# Patient Record
Sex: FEMALE
[De-identification: names, ages, dates, MRNs, and addresses within clinical notes are randomized per-mention and may not be internally consistent; named-entity substitution may affect disease eponyms.]

---

## 2020-04-09 ENCOUNTER — NURSE TRIAGE (OUTPATIENT)
Dept: OTHER | Facility: CLINIC | Age: 60
End: 2020-04-09

## 2020-04-09 NOTE — TELEPHONE ENCOUNTER
Reason for Disposition   Pain also present in shoulder(s) or arm(s) or jaw    Protocols used: CHEST PAIN-ADULT-[place]    Pt is calling with c/o chest, jaw, lower back and abdominal pain that started about 10 minutes prior to calling nurse triage. Pain is constant. Pt does not know the cause of her pain. She has never experienced anything like this before. Pt does have a history of hypertension and COPD. Recommended that pt go to the ED now.

## 2023-10-11 ENCOUNTER — HOSPITAL ENCOUNTER (OUTPATIENT)
Dept: RADIOLOGY | Facility: HOSPITAL | Age: 63
Discharge: HOME | End: 2023-10-11
Payer: MEDICARE

## 2023-10-11 DIAGNOSIS — M19.90 UNSPECIFIED OSTEOARTHRITIS, UNSPECIFIED SITE: ICD-10-CM

## 2023-10-11 PROCEDURE — 73521 X-RAY EXAM HIPS BI 2 VIEWS: CPT | Mod: 50,FY

## 2023-11-02 ENCOUNTER — LAB (OUTPATIENT)
Dept: LAB | Facility: LAB | Age: 63
End: 2023-11-02
Payer: MEDICARE

## 2023-11-02 ENCOUNTER — OFFICE VISIT (OUTPATIENT)
Dept: ORTHOPEDIC SURGERY | Facility: HOSPITAL | Age: 63
End: 2023-11-02
Payer: MEDICARE

## 2023-11-02 DIAGNOSIS — M19.90 INFLAMMATORY ARTHRITIS: Primary | ICD-10-CM

## 2023-11-02 DIAGNOSIS — M19.90 INFLAMMATORY ARTHRITIS: ICD-10-CM

## 2023-11-02 DIAGNOSIS — M19.042 OSTEOARTHRITIS OF BOTH HANDS, UNSPECIFIED OSTEOARTHRITIS TYPE: ICD-10-CM

## 2023-11-02 DIAGNOSIS — G56.03 CARPAL TUNNEL SYNDROME, BILATERAL: ICD-10-CM

## 2023-11-02 DIAGNOSIS — M19.041 OSTEOARTHRITIS OF BOTH HANDS, UNSPECIFIED OSTEOARTHRITIS TYPE: ICD-10-CM

## 2023-11-02 LAB
CRP SERPL-MCNC: 1.73 MG/DL
ERYTHROCYTE [SEDIMENTATION RATE] IN BLOOD BY WESTERGREN METHOD: 21 MM/H (ref 0–30)
RHEUMATOID FACT SER NEPH-ACNC: <10 IU/ML (ref 0–15)
URATE SERPL-MCNC: 5.1 MG/DL (ref 2.3–6.7)

## 2023-11-02 PROCEDURE — 36415 COLL VENOUS BLD VENIPUNCTURE: CPT

## 2023-11-02 PROCEDURE — 86038 ANTINUCLEAR ANTIBODIES: CPT

## 2023-11-02 PROCEDURE — 85652 RBC SED RATE AUTOMATED: CPT

## 2023-11-02 PROCEDURE — 99213 OFFICE O/P EST LOW 20 MIN: CPT | Performed by: ORTHOPAEDIC SURGERY

## 2023-11-02 PROCEDURE — 86431 RHEUMATOID FACTOR QUANT: CPT

## 2023-11-02 PROCEDURE — 1036F TOBACCO NON-USER: CPT | Performed by: ORTHOPAEDIC SURGERY

## 2023-11-02 PROCEDURE — 86140 C-REACTIVE PROTEIN: CPT

## 2023-11-02 PROCEDURE — 84550 ASSAY OF BLOOD/URIC ACID: CPT

## 2023-11-02 RX ORDER — FUROSEMIDE 40 MG/1
TABLET ORAL
COMMUNITY
Start: 2020-10-22

## 2023-11-02 RX ORDER — ALBUTEROL SULFATE 0.83 MG/ML
2.5 SOLUTION RESPIRATORY (INHALATION) EVERY 4 HOURS PRN
COMMUNITY
Start: 2022-09-20

## 2023-11-02 RX ORDER — MODAFINIL 200 MG/1
TABLET ORAL
COMMUNITY
Start: 2023-08-15

## 2023-11-02 RX ORDER — LAMOTRIGINE 300 MG/1
1 TABLET, EXTENDED RELEASE ORAL DAILY
COMMUNITY

## 2023-11-02 RX ORDER — ASPIRIN 81 MG/1
81 TABLET ORAL
COMMUNITY
Start: 2022-05-13

## 2023-11-02 RX ORDER — METHYLPHENIDATE HYDROCHLORIDE 20 MG/1
TABLET ORAL
COMMUNITY

## 2023-11-02 RX ORDER — EVOLOCUMAB 140 MG/ML
140 INJECTION, SOLUTION SUBCUTANEOUS
COMMUNITY
Start: 2023-02-07

## 2023-11-02 RX ORDER — ESCITALOPRAM OXALATE 20 MG/1
30 TABLET ORAL
COMMUNITY
Start: 2015-01-27

## 2023-11-02 RX ORDER — DULOXETIN HYDROCHLORIDE 60 MG/1
60 CAPSULE, DELAYED RELEASE ORAL
COMMUNITY
Start: 2023-08-15

## 2023-11-02 RX ORDER — FLUTICASONE FUROATE, UMECLIDINIUM BROMIDE AND VILANTEROL TRIFENATATE 200; 62.5; 25 UG/1; UG/1; UG/1
1 POWDER RESPIRATORY (INHALATION)
COMMUNITY
Start: 2023-10-18

## 2023-11-02 RX ORDER — ATORVASTATIN CALCIUM 80 MG/1
TABLET, FILM COATED ORAL
COMMUNITY
Start: 2022-12-27

## 2023-11-02 RX ORDER — LEVOTHYROXINE SODIUM 137 UG/1
1 TABLET ORAL DAILY
COMMUNITY
Start: 2013-04-24

## 2023-11-02 RX ORDER — HYDROCODONE BITARTRATE AND ACETAMINOPHEN 10; 300 MG/1; MG/1
TABLET ORAL
COMMUNITY

## 2023-11-02 RX ORDER — ACETAMINOPHEN 500 MG
1000 TABLET ORAL
COMMUNITY

## 2023-11-02 RX ORDER — CYANOCOBALAMIN (VITAMIN B-12) 500 MCG
TABLET ORAL
COMMUNITY

## 2023-11-02 RX ORDER — CLOZAPINE 100 MG/1
TABLET ORAL
COMMUNITY
Start: 2017-12-01

## 2023-11-02 RX ORDER — ALBUTEROL SULFATE 90 UG/1
AEROSOL, METERED RESPIRATORY (INHALATION)
COMMUNITY
Start: 2015-09-02

## 2023-11-02 ASSESSMENT — ENCOUNTER SYMPTOMS
SHORTNESS OF BREATH: 0
WOUND: 0
EYE DISCHARGE: 0
JOINT SWELLING: 1
WHEEZING: 0
FEVER: 0
TROUBLE SWALLOWING: 0
CHILLS: 0

## 2023-11-02 ASSESSMENT — PAIN - FUNCTIONAL ASSESSMENT: PAIN_FUNCTIONAL_ASSESSMENT: 0-10

## 2023-11-02 ASSESSMENT — PAIN SCALES - GENERAL: PAINLEVEL_OUTOF10: 4

## 2023-11-02 NOTE — PROGRESS NOTES
Reason for Appointment  B/l hand pain    History of Present Illness  Patient is a 63 y.o. female here today for follow-up evaluation of bilateral hand pain.  Patient is here today for bilateral hand pain and cramping.  She has a history of bilateral carpal tunnel releases done a few years ago.  She has had improvement in terms of numbness and tingling in the fingers.  She gets global aching and pain in the hands, he also has multiple other joint complaints.  She did have a previous rheumatoid panel that showed an elevated sed rate.  She has not seen a rheumatologist.  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  No other changes in her past medical history, allergies, or medications.    Past Medical History:   Diagnosis Date    Cervicalgia     Neck pain    Pain in unspecified knee 08/13/2013    Joint pain, knee    Pain in unspecified limb     Limb pain    Personal history of other diseases of the circulatory system 08/27/2015    History of cardiac murmur    Personal history of other diseases of the circulatory system     History of hypertension    Personal history of other diseases of the respiratory system 12/15/2014    History of pulmonary emphysema    Personal history of other endocrine, nutritional and metabolic disease     History of hypothyroidism    Personal history of other endocrine, nutritional and metabolic disease     History of hypercholesterolemia       Past Surgical History:   Procedure Laterality Date    KNEE SURGERY  12/15/2014    Knee Surgery    LUMBAR LAMINECTOMY  12/15/2014    Laminectomy Lumbar    OTHER SURGICAL HISTORY  12/15/2014    Arthrodesis Cervical    SHOULDER SURGERY  12/15/2014    Shoulder Surgery       Medication Documentation Review Audit       Reviewed by Sarah Argueta MA (Medical Assistant) on 11/02/23 at 1354      Medication Order Taking? Sig Documenting Provider Last Dose Status    albuterol 2.5 mg /3 mL (0.083 %) nebulizer solution [id number] Yes Inhale 3 mL (2.5 mg) every 4 hours if needed. Historical Provider, MD  Active   albuterol 90 mcg/actuation inhaler [id number] Yes  Historical Provider, MD  Active   aspirin 81 mg EC tablet [id number] Yes Take 1 tablet (81 mg) by mouth once daily. Historical Provider, MD  Active   atorvastatin (Lipitor) 80 mg tablet [id number] Yes  Historical Provider, MD  Active   cariprazine (Vraylar) 6 mg capsule [id number] Yes Take 1 capsule (6 mg) by mouth. Historical Provider, MD  Active   cholecalciferol (Vitamin D-3) 5,000 Units tablet [id number]  Take 1,000 Units by mouth once daily. Historical Provider, MD  Active   cloZAPine (Clozaril) 100 mg tablet [id number] Yes  Historical Provider, MD  Active   DULoxetine (Cymbalta) 60 mg DR capsule [id number] Yes Take 1 capsule (60 mg) by mouth once daily. Historical Provider, MD  Active   escitalopram (Lexapro) 20 mg tablet [id number] Yes Take 1.5 tablets (30 mg) by mouth once daily. Historical Provider, MD  Active   furosemide (Lasix) 40 mg tablet [id number] Yes  Historical Provider, MD  Active   HYDROcodone-acetaminophen (Vicodin)  mg tablet [id number]   Historical Provider, MD  Active   lamoTRIgine (LaMICtal XR) 300 mg tablet extended release 24hr 24 hr tablet [id number]  Take 1 tablet (300 mg) by mouth once daily. Historical Provider, MD  Active   levothyroxine (Synthroid, Levoxyl) 137 mcg tablet [id number] Yes Take 1 tablet (137 mcg) by mouth once daily. Historical Provider, MD  Active   melatonin 1 mg tablet [id number]  Take by mouth. Historical Provider, MD  Active   methylphenidate (Ritalin) 20 mg tablet [id number]  take 1 tablet by mouth four times a day DO NOT FILL BEFORE 10 15 23 Historical Provider, MD  Active   modafinil (Provigil) 200 mg tablet [id number] Yes  Historical Provider, MD  Active   Repatha SureClick 140 mg/mL injection [id number] Yes Inject 1 mL (140 mg) under the skin every 14 (fourteen) days.  Historical Provider, MD  Active   Chip Ellipta 200-62.5-25 mcg blister with device [id number] Yes Inhale 1 puff once daily. Historical Provider, MD  Active                    Allergies   Allergen Reactions    Adhesive Unknown    Lisinopril Angioedema    Losartan Angioedema    Oxcarbazepine Hives, Rash and Other     hyponatremia    Risperidone Analogues Other, Rash and Shortness of breath     Lactation    Pregabalin Swelling and Unknown    Zolpidem Rash, Unknown and Nausea/vomiting    Aripiprazole Hallucinations    Latex Rash       Review of Systems   Constitutional:  Negative for chills and fever.   HENT:  Negative for trouble swallowing.    Eyes:  Negative for discharge.   Respiratory:  Negative for shortness of breath and wheezing.    Cardiovascular:  Negative for chest pain.   Musculoskeletal:  Positive for joint swelling.   Skin:  Negative for rash and wound.   All other systems reviewed and are negative.    Exam   On exam bilateral hands show mild DJD, she has well-healed previous carpal tunnel scars.  Minimally positive Tinel's over bilateral median nerves.  Mild tenderness over the TFCC region on the left hand.  Good digital motion with no triggering.  Good pulses and sensation in the upper extremities.    Assessment   Encounter Diagnoses   Name Primary?    Inflammatory arthritis Yes    Carpal tunnel syndrome, bilateral     Osteoarthritis of both hands, unspecified osteoarthritis type        Plan   With her history of an elevated sed rate and global pain and aching in the hands and multiple other joints, a repeat rheumatoid panel is warranted to evaluate for any systemic cause of inflammation.  We will refer her to a rheumatologist as well.  She may be having some early recurrent carpal tunnel symptoms, we will get her bilateral wrist braces to wear at night and we did discuss possible injections in the future if her symptoms continue.  We will call her in a week with lab results.    Written by Lakshmi  Mica Swann saw, evaluated, and treated the patient with the PA

## 2023-11-03 LAB — ANA SER QL HEP2 SUBST: NEGATIVE

## 2023-11-10 ENCOUNTER — TELEPHONE (OUTPATIENT)
Dept: ORTHOPEDIC SURGERY | Facility: CLINIC | Age: 63
End: 2023-11-10
Payer: MEDICARE

## 2023-11-10 NOTE — TELEPHONE ENCOUNTER
----- Message from Lakshmi Nino PA-C sent at 11/10/2023  8:20 AM EST -----  I tried to call her on Tuesday, left message with no answer, please call again, labs are normal other than CRP is elevated, non-specific marker and she does not need to see rheum

## 2023-11-10 NOTE — RESULT ENCOUNTER NOTE
I tried to call her on Tuesday, left message with no answer, please call again, labs are normal other than CRP is elevated, non-specific marker and she does not need to see rheum

## 2023-11-15 RX ORDER — DOXYCYCLINE HYCLATE 100 MG
100 TABLET ORAL 2 TIMES DAILY
COMMUNITY
Start: 2023-11-06

## 2023-11-15 RX ORDER — LOSARTAN POTASSIUM 50 MG/1
50 TABLET ORAL DAILY
COMMUNITY
Start: 2023-11-06

## 2023-11-15 RX ORDER — DEXMETHYLPHENIDATE HYDROCHLORIDE 10 MG/1
TABLET ORAL
COMMUNITY
Start: 2023-11-09

## 2024-01-30 ENCOUNTER — APPOINTMENT (OUTPATIENT)
Dept: OTOLARYNGOLOGY | Facility: CLINIC | Age: 64
End: 2024-01-30
Payer: MEDICARE

## 2024-02-13 ENCOUNTER — APPOINTMENT (OUTPATIENT)
Dept: OTOLARYNGOLOGY | Facility: CLINIC | Age: 64
End: 2024-02-13
Payer: MEDICARE

## 2024-02-29 ENCOUNTER — OFFICE VISIT (OUTPATIENT)
Dept: OTOLARYNGOLOGY | Facility: CLINIC | Age: 64
End: 2024-02-29
Payer: MEDICARE

## 2024-02-29 VITALS — WEIGHT: 240.4 LBS | BODY MASS INDEX: 41.04 KG/M2 | HEIGHT: 64 IN | TEMPERATURE: 97.1 F

## 2024-02-29 DIAGNOSIS — R49.0 HOARSENESS: ICD-10-CM

## 2024-02-29 DIAGNOSIS — K14.8 TONGUE LESION: Primary | ICD-10-CM

## 2024-02-29 DIAGNOSIS — R13.10 DYSPHAGIA, UNSPECIFIED TYPE: ICD-10-CM

## 2024-02-29 PROCEDURE — 1036F TOBACCO NON-USER: CPT | Performed by: OTOLARYNGOLOGY

## 2024-02-29 PROCEDURE — 99203 OFFICE O/P NEW LOW 30 MIN: CPT | Performed by: OTOLARYNGOLOGY

## 2024-02-29 PROCEDURE — 31575 DIAGNOSTIC LARYNGOSCOPY: CPT | Performed by: OTOLARYNGOLOGY

## 2024-02-29 NOTE — PROGRESS NOTES
Chief Complaint   Patient presents with    New Patient Visit     NP SORES UNDER TONGUE FOR OVER 2 MONTHS     HPI:  Gela Rivera is a 63 y.o. female who complains of several month history of some painful lesion underneath both sides of her tongue more so on the right today.  Also several month history of difficulty swallowing where things feel like he gets stuck behind her tongue and her throat.  For a while longer she has been having some choking with swallowing.  No pain does have some occasional voice change.  Ex-smoker.  No neck mass.    PMH:  Past Medical History:   Diagnosis Date    Cervicalgia     Neck pain    Pain in unspecified knee 08/13/2013    Joint pain, knee    Pain in unspecified limb     Limb pain    Personal history of other diseases of the circulatory system 08/27/2015    History of cardiac murmur    Personal history of other diseases of the circulatory system     History of hypertension    Personal history of other diseases of the respiratory system 12/15/2014    History of pulmonary emphysema    Personal history of other endocrine, nutritional and metabolic disease     History of hypothyroidism    Personal history of other endocrine, nutritional and metabolic disease     History of hypercholesterolemia     Past Surgical History:   Procedure Laterality Date    KNEE SURGERY  12/15/2014    Knee Surgery    LUMBAR LAMINECTOMY  12/15/2014    Laminectomy Lumbar    OTHER SURGICAL HISTORY  12/15/2014    Arthrodesis Cervical    SHOULDER SURGERY  12/15/2014    Shoulder Surgery         Medications:     Current Outpatient Medications:     albuterol 2.5 mg /3 mL (0.083 %) nebulizer solution, Inhale 3 mL (2.5 mg) every 4 hours if needed., Disp: , Rfl:     albuterol 90 mcg/actuation inhaler, , Disp: , Rfl:     aspirin 81 mg EC tablet, Take 1 tablet (81 mg) by mouth once daily., Disp: , Rfl:     atorvastatin (Lipitor) 80 mg tablet, , Disp: , Rfl:     cariprazine (Vraylar) 6 mg capsule, Take 1 capsule (6 mg) by  mouth., Disp: , Rfl:     cholecalciferol (Vitamin D-3) 5,000 Units tablet, Take 1,000 Units by mouth once daily., Disp: , Rfl:     cloZAPine (Clozaril) 100 mg tablet, , Disp: , Rfl:     doxycycline (Vibra-Tabs) 100 mg tablet, Take 1 tablet (100 mg) by mouth 2 times a day., Disp: , Rfl:     DULoxetine (Cymbalta) 60 mg DR capsule, Take 1 capsule (60 mg) by mouth once daily., Disp: , Rfl:     escitalopram (Lexapro) 20 mg tablet, Take 1.5 tablets (30 mg) by mouth once daily., Disp: , Rfl:     furosemide (Lasix) 40 mg tablet, , Disp: , Rfl:     HYDROcodone-acetaminophen (Vicodin)  mg tablet, , Disp: , Rfl:     lamoTRIgine (LaMICtal XR) 300 mg tablet extended release 24hr 24 hr tablet, Take 1 tablet (300 mg) by mouth once daily., Disp: , Rfl:     levothyroxine (Synthroid, Levoxyl) 137 mcg tablet, Take 1 tablet (137 mcg) by mouth once daily., Disp: , Rfl:     losartan (Cozaar) 50 mg tablet, Take 1 tablet (50 mg) by mouth once daily., Disp: , Rfl:     melatonin 1 mg tablet, Take by mouth., Disp: , Rfl:     methylphenidate (Ritalin) 20 mg tablet, take 1 tablet by mouth four times a day DO NOT FILL BEFORE 10 15 23, Disp: , Rfl:     modafinil (Provigil) 200 mg tablet, , Disp: , Rfl:     Repatha SureClick 140 mg/mL injection, Inject 1 mL (140 mg) under the skin every 14 (fourteen) days., Disp: , Rfl:     Trelegy Ellipta 200-62.5-25 mcg blister with device, Inhale 1 puff once daily., Disp: , Rfl:     dexmethylphenidate (Focalin) 10 mg tablet, , Disp: , Rfl:      Allergies:  Allergies   Allergen Reactions    Adhesive Unknown    Lisinopril Angioedema    Losartan Angioedema    Oxcarbazepine Hives, Rash and Other     hyponatremia    Risperidone Analogues Other, Rash and Shortness of breath     Lactation    Pregabalin Swelling and Unknown    Zolpidem Rash, Unknown and Nausea/vomiting    Aripiprazole Hallucinations    Latex Rash        ROS:  Review of systems normal unless stated otherwise in the HPI and/or PMH.    Physical  "Exam:  Temperature 36.2 °C (97.1 °F), height 1.626 m (5' 4\"), weight 109 kg (240 lb 6.4 oz). Body mass index is 41.26 kg/m².     GENERAL APPEARANCE: Well developed and well nourished.  Alert and oriented in no acute distress.  Normal vocal quality.      HEAD/FACE: No erythema or edema or facial tenderness.  Normal facial nerve function bilaterally.    EAR:       EXTERNAL: Normal pinnas and external auditory canals without lesion or obstructing wax.       MIDDLE EAR: Tympanic membranes intact and mobile with normal landmarks.  Middle ear space appears well aerated.       TUBE STATUS: N/A       MASTOID CAVITY: N/A       HEARING: Gross hearing assessment is within normal limits.      NOSE:       VISUALIZED USING: Anterior rhinoscopy with headlight and nasal speculum.       DORSUM: Midline, nontraumatic appearance.       MUCOSA: Normal-appearing.       SECRETIONS: Normal.       SEPTUM: Midline and nonobstructing.       INFERIOR TURBINATES: Normal.       MIDDLE TURBINATES/MEATUS: N/A       BLEEDING: N/A         ORAL CAVITY/PHARYNX:       TEETH: Adequate dentition.       TONGUE: Very large and redundant tongue.  Right lateral ventral tongue with very clean shallow ulcer measuring about 10 x 5 mm.  No friability.  Scar tissue on the left in a similar location.  Normal mobility.       FLOOR OF MOUTH: No mass or lesion.       PALATE: Normal hard palate, soft palate, and uvula.       OROPHARYNX: Normal without mass or lesion.       BUCCAL MUCOSA/GBS: Normal without mass or lesion.       LIPS: Normal.    LARYNX/HYPOPHARYNX/NASOPHARYNX: Flexible laryngoscopy was performed after consent secondary to an inadequate mirror examination.  The flexible laryngoscope was placed through the nasal cavity revealing normal nasopharynx, normal oropharynx, normal hypopharynx, and normal larynx.  There is normal bilateral vocal cord mobility without any mucosal masses or lesions.    NECK: No palpable masses or abnormal adenopathy.  Trachea is " midline.    THYROID: No thyromegaly or palpable nodule.    SALIVARY GLANDS: Normal bilateral parotid and submandibular glands by inspection and palpation.    TMJ's: Normal.    NEURO: Cranial nerve exam grossly normal bilaterally.       Assessment/Plan   Gela was seen today for new patient visit.  Diagnoses and all orders for this visit:  Tongue lesion (Primary)  Hoarseness  Dysphagia, unspecified type  -     FL modified barium swallow study; Future     I think her tongue lesion is related to trauma from rubbing on her teeth.  Gave her a prescription for Persaud's Rinse and I like her to talk to her dentist.  Be mindful of this.  Will continue to check this if not getting better may need excisional biopsy.  Negative scoping today.  Recommend modified barium swallow to look into her dysphagia.  Follow up in about 1 month (around 3/29/2024).     Can Zurita MD

## 2024-03-05 ENCOUNTER — APPOINTMENT (OUTPATIENT)
Dept: OTOLARYNGOLOGY | Facility: CLINIC | Age: 64
End: 2024-03-05
Payer: MEDICARE

## 2024-03-19 ENCOUNTER — HOSPITAL ENCOUNTER (OUTPATIENT)
Dept: RADIOLOGY | Facility: HOSPITAL | Age: 64
Discharge: HOME | End: 2024-03-19
Payer: MEDICARE

## 2024-03-19 DIAGNOSIS — R13.10 DYSPHAGIA, UNSPECIFIED TYPE: ICD-10-CM

## 2024-03-19 PROCEDURE — 2500000001 HC RX 250 WO HCPCS SELF ADMINISTERED DRUGS (ALT 637 FOR MEDICARE OP): Performed by: OTOLARYNGOLOGY

## 2024-03-19 PROCEDURE — 74230 X-RAY XM SWLNG FUNCJ C+: CPT

## 2024-03-19 PROCEDURE — 3430000001 HC RX 343 DIAGNOSTIC RADIOPHARMACEUTICALS: Performed by: OTOLARYNGOLOGY

## 2024-03-19 RX ADMIN — BARIUM SULFATE 90 ML: 400 SUSPENSION ORAL at 14:34

## 2024-03-19 RX ADMIN — BARIUM SULFATE 5 ML: 400 SUSPENSION ORAL at 13:01

## 2024-03-19 RX ADMIN — BARIUM SULFATE 10 ML: 400 PASTE ORAL at 13:03

## 2024-03-19 RX ADMIN — BARIUM SULFATE 120 ML: 0.81 POWDER, FOR SUSPENSION ORAL at 13:02

## 2024-03-19 RX ADMIN — BARIUM SULFATE 700 MG: 700 TABLET ORAL at 13:00

## 2024-03-19 NOTE — PROCEDURES
"Speech-Language Pathology        Modified Barium Swallow Study     Patient Name: Gela Rivera  MRN: [id number]  : 1960  Today's Date: 24     Time Calculation  Start Time: 1300  Stop Time: 1340  Time Calculation (min): 40 min    Recommendations:  Regular and thin liquids     Assessment/Impression:    Full detailed SLP/Radiologist Modified Barium Swallow study report can be found under Chart Review tab, Imaging tab and  titled \"FL Modified Barium Swallow Study\"      Pt. Presenting with functional oropharyngeal stage swallow: normal pharyngeal swallow onset, pharyngeal clearance, without laryngeal penetration or aspiration. Cricopharyngeal bar/hypertrophy observed with complete clearance of all textures, through pharyngoesophgeal segment, intermittent trace retrograde flow was observed    Plan:  Would suggest out patient tx for further instruction and education to help manage dysphagia symtoms  Pain:   Rating 0-10: 0    Education:   Pt. Educated on results of MBS study, recommended diet and recommended safe swallow strategies      "

## 2024-03-25 ENCOUNTER — APPOINTMENT (OUTPATIENT)
Dept: OTOLARYNGOLOGY | Facility: CLINIC | Age: 64
End: 2024-03-25
Payer: MEDICARE

## 2024-04-01 ENCOUNTER — APPOINTMENT (OUTPATIENT)
Dept: OTOLARYNGOLOGY | Facility: CLINIC | Age: 64
End: 2024-04-01
Payer: MEDICARE

## 2024-04-22 ENCOUNTER — OFFICE VISIT (OUTPATIENT)
Dept: OTOLARYNGOLOGY | Facility: CLINIC | Age: 64
End: 2024-04-22
Payer: MEDICARE

## 2024-04-22 VITALS — BODY MASS INDEX: 40.46 KG/M2 | HEIGHT: 64 IN | WEIGHT: 237 LBS

## 2024-04-22 DIAGNOSIS — R13.10 DYSPHAGIA, UNSPECIFIED TYPE: ICD-10-CM

## 2024-04-22 DIAGNOSIS — K14.8 TONGUE LESION: Primary | ICD-10-CM

## 2024-04-22 DIAGNOSIS — J39.2 CRICOPHARYNGEAL HYPERTROPHY: ICD-10-CM

## 2024-04-22 PROCEDURE — 1036F TOBACCO NON-USER: CPT | Performed by: OTOLARYNGOLOGY

## 2024-04-22 PROCEDURE — 99214 OFFICE O/P EST MOD 30 MIN: CPT | Performed by: OTOLARYNGOLOGY

## 2024-04-22 NOTE — PROGRESS NOTES
Chief Complaint   Patient presents with    Follow-up     EP- 1 MO F/U ORAL LESION      HPI:  Gela Rivera is a 63 y.o. female who presents today in follow-up for her several month history of some painful lesion underneath both sides of her tongue more so on the right today.  Also several month history of difficulty swallowing where things feel like he gets stuck behind her tongue and her throat.    No pain does have some occasional voice change.  Ex-smoker.  No neck mass.  Tongue feels better after treatment, back to normal.  Not bothering her at all.  Modified barium swallow showed evidence of probable cricopharyngeal bar and hypertrophy.    PMH:  Past Medical History:   Diagnosis Date    Cervicalgia     Neck pain    Pain in unspecified knee 08/13/2013    Joint pain, knee    Pain in unspecified limb     Limb pain    Personal history of other diseases of the circulatory system 08/27/2015    History of cardiac murmur    Personal history of other diseases of the circulatory system     History of hypertension    Personal history of other diseases of the respiratory system 12/15/2014    History of pulmonary emphysema    Personal history of other endocrine, nutritional and metabolic disease     History of hypothyroidism    Personal history of other endocrine, nutritional and metabolic disease     History of hypercholesterolemia     Past Surgical History:   Procedure Laterality Date    KNEE SURGERY  12/15/2014    Knee Surgery    LUMBAR LAMINECTOMY  12/15/2014    Laminectomy Lumbar    OTHER SURGICAL HISTORY  12/15/2014    Arthrodesis Cervical    SHOULDER SURGERY  12/15/2014    Shoulder Surgery         Medications:     Current Outpatient Medications:     albuterol 2.5 mg /3 mL (0.083 %) nebulizer solution, Inhale 3 mL (2.5 mg) every 4 hours if needed., Disp: , Rfl:     albuterol 90 mcg/actuation inhaler, , Disp: , Rfl:     aspirin 81 mg EC tablet, Take 1 tablet (81 mg) by mouth once daily., Disp: , Rfl:     atorvastatin  (Lipitor) 80 mg tablet, , Disp: , Rfl:     cariprazine (Vraylar) 6 mg capsule, Take 1 capsule (6 mg) by mouth., Disp: , Rfl:     cholecalciferol (Vitamin D-3) 5,000 Units tablet, Take 1,000 Units by mouth once daily., Disp: , Rfl:     cloZAPine (Clozaril) 100 mg tablet, , Disp: , Rfl:     dexmethylphenidate (Focalin) 10 mg tablet, , Disp: , Rfl:     doxycycline (Vibra-Tabs) 100 mg tablet, Take 1 tablet (100 mg) by mouth 2 times a day., Disp: , Rfl:     DULoxetine (Cymbalta) 60 mg DR capsule, Take 1 capsule (60 mg) by mouth once daily., Disp: , Rfl:     escitalopram (Lexapro) 20 mg tablet, Take 1.5 tablets (30 mg) by mouth once daily., Disp: , Rfl:     furosemide (Lasix) 40 mg tablet, , Disp: , Rfl:     HYDROcodone-acetaminophen (Vicodin)  mg tablet, , Disp: , Rfl:     lamoTRIgine (LaMICtal XR) 300 mg tablet extended release 24hr 24 hr tablet, Take 1 tablet (300 mg) by mouth once daily., Disp: , Rfl:     levothyroxine (Synthroid, Levoxyl) 137 mcg tablet, Take 1 tablet (137 mcg) by mouth once daily., Disp: , Rfl:     losartan (Cozaar) 50 mg tablet, Take 1 tablet (50 mg) by mouth once daily., Disp: , Rfl:     melatonin 1 mg tablet, Take by mouth., Disp: , Rfl:     methylphenidate (Ritalin) 20 mg tablet, take 1 tablet by mouth four times a day DO NOT FILL BEFORE 10 15 23, Disp: , Rfl:     modafinil (Provigil) 200 mg tablet, , Disp: , Rfl:     Repatha SureClick 140 mg/mL injection, Inject 1 mL (140 mg) under the skin every 14 (fourteen) days., Disp: , Rfl:     Trelegy Ellipta 200-62.5-25 mcg blister with device, Inhale 1 puff once daily., Disp: , Rfl:      Allergies:  Allergies   Allergen Reactions    Adhesive Unknown    Lisinopril Angioedema    Losartan Angioedema    Oxcarbazepine Hives, Rash and Other     hyponatremia    Risperidone Analogues Other, Rash and Shortness of breath     Lactation    Pregabalin Swelling and Unknown    Zolpidem Rash, Unknown and Nausea/vomiting    Aripiprazole Hallucinations    Latex  "Rash        ROS:  Review of systems normal unless stated otherwise in the HPI and/or PMH.    Physical Exam:  Height 1.626 m (5' 4\"), weight 108 kg (237 lb). Body mass index is 40.68 kg/m².     GENERAL APPEARANCE: Well developed and well nourished.  Alert and oriented in no acute distress.  Normal vocal quality.      HEAD/FACE: No erythema or edema or facial tenderness.  Normal facial nerve function bilaterally.    EAR:       EXTERNAL: Normal pinnas and external auditory canals without lesion or obstructing wax.       MIDDLE EAR: Tympanic membranes intact and mobile with normal landmarks.  Middle ear space appears well aerated.       TUBE STATUS: N/A       MASTOID CAVITY: N/A       HEARING: Gross hearing assessment is within normal limits.      NOSE:       VISUALIZED USING: Anterior rhinoscopy with headlight and nasal speculum.       DORSUM: Midline, nontraumatic appearance.       MUCOSA: Normal-appearing.       SECRETIONS: Normal.       SEPTUM: Midline and nonobstructing.       INFERIOR TURBINATES: Normal.       MIDDLE TURBINATES/MEATUS: N/A       BLEEDING: N/A         ORAL CAVITY/PHARYNX:       TEETH: Adequate dentition.       TONGUE: Very large and redundant tongue.  Right lateral ventral tongue with very ulcer measuring about 10 x 5 mm has resolved.  She has an area of probable scar where can imagine it was previously present.  No lesion or ulceration.  No friability.   Normal mobility.       FLOOR OF MOUTH: No mass or lesion.       PALATE: Normal hard palate, soft palate, and uvula.       OROPHARYNX: Normal without mass or lesion.       BUCCAL MUCOSA/GBS: Normal without mass or lesion.       LIPS: Normal.    LARYNX/HYPOPHARYNX/NASOPHARYNX: N/A    NECK: No palpable masses or abnormal adenopathy.  Trachea is midline.    THYROID: No thyromegaly or palpable nodule.    SALIVARY GLANDS: Normal bilateral parotid and submandibular glands by inspection and palpation.    TMJ's: Normal.    NEURO: Cranial nerve exam grossly " normal bilaterally.       Assessment/Plan   Gela was seen today for follow-up.  Diagnoses and all orders for this visit:  Tongue lesion (Primary)  Dysphagia, unspecified type  Cricopharyngeal hypertrophy    Previous tongue lesions have resolved.  Likely from trauma rubbing on her teeth.  Continued education about this.  Please follow-up with any changes or worsening symptoms.  Recommend GI evaluation for further evaluation and treatment of her cricopharyngeal hypertrophy.  This could be treated either with endoscopic dilation versus endoscopic or open resection.  She does have a GI doctor she sees at the [facility] and she will follow-up with them.  Follow up if symptoms worsen or fail to improve.     Can Zurita MD

## 2025-03-27 ENCOUNTER — APPOINTMENT (OUTPATIENT)
Dept: ORTHOPEDIC SURGERY | Facility: HOSPITAL | Age: 65
End: 2025-03-27
Payer: MEDICARE

## 2025-04-10 ENCOUNTER — APPOINTMENT (OUTPATIENT)
Dept: ORTHOPEDIC SURGERY | Facility: HOSPITAL | Age: 65
End: 2025-04-10
Payer: MEDICARE

## 2025-04-10 ENCOUNTER — APPOINTMENT (OUTPATIENT)
Dept: RADIOLOGY | Facility: HOSPITAL | Age: 65
End: 2025-04-10
Payer: MEDICARE

## 2025-04-10 DIAGNOSIS — M25.532 LEFT WRIST PAIN: Primary | ICD-10-CM
